# Patient Record
Sex: FEMALE | Race: WHITE | NOT HISPANIC OR LATINO | Employment: FULL TIME | URBAN - METROPOLITAN AREA
[De-identification: names, ages, dates, MRNs, and addresses within clinical notes are randomized per-mention and may not be internally consistent; named-entity substitution may affect disease eponyms.]

---

## 2018-04-18 ENCOUNTER — TRANSCRIBE ORDERS (OUTPATIENT)
Dept: ADMINISTRATIVE | Facility: HOSPITAL | Age: 58
End: 2018-04-18

## 2018-04-18 DIAGNOSIS — G47.30 SLEEP APNEA, UNSPECIFIED TYPE: Primary | ICD-10-CM

## 2018-08-22 ENCOUNTER — OFFICE VISIT (OUTPATIENT)
Dept: SLEEP CENTER | Facility: CLINIC | Age: 58
End: 2018-08-22
Payer: COMMERCIAL

## 2018-08-22 VITALS
WEIGHT: 193.6 LBS | DIASTOLIC BLOOD PRESSURE: 82 MMHG | BODY MASS INDEX: 30.39 KG/M2 | HEIGHT: 67 IN | SYSTOLIC BLOOD PRESSURE: 132 MMHG | HEART RATE: 80 BPM

## 2018-08-22 DIAGNOSIS — G47.30 SLEEP APNEA, UNSPECIFIED TYPE: ICD-10-CM

## 2018-08-22 PROCEDURE — 99204 OFFICE O/P NEW MOD 45 MIN: CPT | Performed by: NURSE PRACTITIONER

## 2018-08-22 RX ORDER — LEVOTHYROXINE SODIUM 0.1 MG/1
100 TABLET ORAL
COMMUNITY
Start: 2016-06-15

## 2018-08-22 RX ORDER — CLONAZEPAM 0.25 MG/1
0.25 TABLET, ORALLY DISINTEGRATING ORAL 2 TIMES DAILY
COMMUNITY
Start: 2018-08-21 | End: 2018-09-20

## 2018-08-22 RX ORDER — ZOLPIDEM TARTRATE 10 MG/1
TABLET ORAL
Qty: 1 TABLET | Refills: 0 | Status: SHIPPED | OUTPATIENT
Start: 2018-08-22

## 2018-08-22 RX ORDER — IBUPROFEN 200 MG
400 TABLET ORAL EVERY 6 HOURS
COMMUNITY

## 2018-08-22 RX ORDER — SIMVASTATIN 20 MG
20 TABLET ORAL
COMMUNITY
Start: 2018-08-21

## 2018-08-22 NOTE — PATIENT INSTRUCTIONS
1   Schedule split night study  2  Schedule follow-up appointment to review results of the study  3  Schedule DME appointment with [facility] for CPAP set up  4    Schedule compliance visit and evaluation of CPAP  8-12 weeks after starting CPAP

## 2018-08-22 NOTE — PROGRESS NOTES
Consultation - [address] [facility], 1960, MRN: [id number]    8/22/2018        Reason for Consult / Principal Problem:    Evaluation of sleep difficulties     Subjective:     HPI: Rk Sanchez is a 61y o  year old female  She presents today for evaluation of sleeping difficulties  She completed a split night sleep study at [address] [place] [address] in 2015 and was found to have mild sleep apnea  She was unable to tolerate CPAP during that study  She received a TAP oral appliance in 2015 and wore it for approximately one year  She went for an adjustment and found that the provider had retired  For the past 6 months she has experienced increasing symptoms of nighttime awakenings  She snores loudly and her daughter told her that she gasps for air when she is sleeping  She does not awaken feeling refreshed on most mornings  Recently, she has also been having difficulty falling asleep  Over the past week it has taken several hours for her to fall asleep  In the past, she has had periods of anxiety with panic attacks  The symptoms were improved with lexapro and therapy  She has had an increase in anxiety lately  She has hypothyroidism and normally takes 100 mcg of levothyroxine  She has had an intentional weight loss of 15 lb over the past 4 months  Her primary care physician has ordered thyroid blood work, which was completed yesterday, however, results are not available at this time  Review of Systems      Genitourinary need to urinate more than twice a night, hot flashes at night and post menopausal (no peroids)   Cardiology none   Gastrointestinal none   Neurology none   Constitutional none   Integumentary none   Psychiatry anxiety   Musculoskeletal none   Pulmonary snoring   ENT none   Endocrine frequent urination   Hematological none     Employment:  She currently works full-time as a  between the hours of 8:00 a m  and 5:00 p m   Monday through Friday    Sleep Schedule:       Bedtime:  10:00 p m  on week days and 11:00 p m  on weekends      Latency:  Sleep latency can be up to 2-3 hours and has been worse lately      Wakeup time:  6:30 a m  on weekdays and 8:00 a m  on weekends    Awakenings:       Frequency:  4-5 times per night      Causes:  Unknown causes, jolts awake      Duration: This varies between minutes up to several hours    Daytime Sleepiness / Inappropriate Sleep:       Most severe: In the afternoon between 2:00 p m  and 3:00 p m  Naps :  Once per week      Time:  Between 1:00 p m  and 2:00 p m  Duration:  30-45 minutes, naps are refreshing       Inappropriate drowsiness / sleep:  She is not find herself falling asleep with sedentary activities  Occasionally will doze in the car as a passenger  Snoring:  Family reports snoring    Apnea:  Family has reported gasping during sleep    Change in Weight:  15 lb intentional weight loss over the past 4 months    Restless Leg Syndrome:  She does occasionally experience clinical symptoms consistent with this diagnosis including year possible urge to move her legs when she lays down to go to sleep and some jerking movements, which prevent her from falling asleep  Symptoms resolve spontaneously    Other Complaints:  She denies any sleep walking, sleep talking, sleep paralysis or hallucinations surrounding sleep  Social History:      Caffeine:  8-10 oz of caffeinated coffee daily      Tobacco:   reports that she has never smoked  She has never used smokeless tobacco        Alcohol:   reports that she drinks alcohol  3-4 glasses of wine per week     Drugs:   reports that she does not use drugs         The review of systems and following portions of the patient's history were reviewed and updated as appropriate: allergies, current medications, past family history, past medical history, past social history, past surgical history and problem list       Objective:       Vitals:    08/22/18 0800   BP: 132/82   Pulse: 80   Weight: 87 8 kg (193 lb 9 6 oz)   Height: 5' 7" (1 702 m)     Body mass index is 30 32 kg/m²  Neck Circumference: 34 5 (cm)  [place] Sleepiness Scale: Total score: 10      Current Outpatient Prescriptions:     Calcium-Magnesium-Vitamin D (CALCIUM MAGNESIUM PO), Take by mouth, Disp: , Rfl:     clonazePAM (KlonoPIN) 0 25 MG disintegrating tablet, Take 0 25 mg by mouth 2 (two) times a day, Disp: , Rfl:     Coenzyme Q10 (COQ10 PO), Take by mouth, Disp: , Rfl:     ibuprofen (MOTRIN) 200 mg tablet, Take 400 mg by mouth every 6 (six) hours, Disp: , Rfl:     levothyroxine 100 mcg tablet, Take 100 mcg by mouth, Disp: , Rfl:     Multiple Vitamins-Minerals (MULTIVITAMIN PO), Take 1 each by mouth, Disp: , Rfl:     Probiotic Product (PRO-BIOTIC BLEND PO), Take by mouth, Disp: , Rfl:     simvastatin (ZOCOR) 20 mg tablet, 20 mg, Disp: , Rfl:     zolpidem (AMBIEN) 10 mg tablet, Take 1/2 tablet at lights out and may repeat in 30 minutes, if not sleeping, Disp: 1 tablet, Rfl: 0    Physical Exam  General Appearance:   Alert, cooperative, no distress, appears stated age, mildly obese     Head:   Normocephalic, without obvious abnormality, atraumatic     Eyes:   PERRL, conjunctiva/corneas clear, EOM's intact          Nose:  Nares normal, septum midline, mucosa normal, no drainage or sinus tenderness           Throat:  Lips, teeth and gums normal; tongue normal size and  shape and midline in position; mucosa moist and redundant bilaterally, uvula thick at the base and dipping below the base of the tongue, tonsils not visualized, Mallampati class 3       Neck:  Supple, symmetrical, trachea midline, no adenopathy;  Thyroid: No enlargement, tenderness or nodules; no carotid bruit or JVD     Lungs:      Clear to auscultation bilaterally, respirations unlabored     Heart:   Regular rate and rhythm, S1 and S2 normal, no murmur, rub or gallop       Extremities:  Extremities normal, atraumatic, no cyanosis or edema     Pulses:  2+ and symmetric all extremities     Skin:  Skin color, texture, turgor normal, no rashes or lesions       Neurologic:  CNII-XII intact  Normal strength, sensation throughout     Sleep Study Results:  Split night sleep study completed in April 2015 indicates mild sleep apnea with an AHI of 9  CPAP pressure was titrated up to 10-12 with alleviation of hypopneas, however, she was unable to tolerate CPAP  ASSESSMENT / PLAN     1  Sleep apnea, unspecified type  Sleep Consult - N/P Only    Split Study    zolpidem (AMBIEN) 10 mg tablet         Counseling / Coordination of Care  Total clinic time spent today 50 minutes  Greater than 50% of total time was spent with the patient and / or family counseling and / or coordination of care  A description of the counseling / coordination of care:     diagnostic results, instructions for management, risk factor reductions, prognosis, patient and family education, impressions, risks and benefits of treatment options and importance of compliance with treatment    Today we discussed the anatomy and physiology of the upper airway  I pointed out how changes in this region can result in both snoring and abnormal breathing events including apneas and hypopneas  I explained the most common co-morbidities of untreated sleep apnea  After this we talked about some forms of treatment including application of positive airway pressure, mandibular advancement devices and surgery  In order to evaluate the severity of Obstructive Sleep Apnea as a cause of the patient's worsening symptoms, a split study test will be completed  If significant abnormal nocturnal breathing is detected, nasal CPAP will be titrated to find the optimum pressure needed to maintain upper airway patency during sleep  Following testing, the patient will return to the Sleep [address] [place] [address] for a review of the test results and to set up equipment      The following instructions have been given to the patient today:    Patient Instructions   1  Schedule split night study  2  Schedule follow-up appointment to review results of the study  3  Schedule DME appointment with [facility] for CPAP set up  4    Schedule compliance visit and evaluation of CPAP  8-12 weeks after starting CPAP          Rosie Lorenzo, [address] [facility]

## 2018-10-08 ENCOUNTER — TELEPHONE (OUTPATIENT)
Dept: SLEEP CENTER | Facility: CLINIC | Age: 58
End: 2018-10-08

## 2018-10-18 ENCOUNTER — TRANSCRIBE ORDERS (OUTPATIENT)
Dept: SLEEP CENTER | Facility: CLINIC | Age: 58
End: 2018-10-18

## 2018-10-18 DIAGNOSIS — G47.33 OBSTRUCTIVE SLEEP APNEA SYNDROME: Primary | ICD-10-CM

## 2018-10-18 NOTE — TELEPHONE ENCOUNTER
Discussed with patient  Home sleep study has been ordered  Please call her to schedule 
Pt is scheduled for HST 
Spoke with Tracy Ndiaye from [place]  Pt was denied for Split Study ([id number])  Intake ID#: [id number] 
No abnormalities

## 2018-10-18 NOTE — PROGRESS NOTES
Spoke with patient regarding insurance denial of split night study  She had a diagnostic sleep study in 2014 with a CPAP titration study at [facility]  She states that she had a very bad night of sleep during CPAP titration and had difficulty with using different masks, etc   She did not continue CPAP  She has used a mouth guard since that time, but continues to snore  She has had some recent weight loss, which may have improved her sleep apnea, however, her  has noticed recent periods of apnea  A home sleep study will be ordered to confirm current diagnosis of sleep apnea  A CPAP titration study will be planned to allow her a full night to work with the technician for titration, since she had some difficulty at her prior study

## 2018-12-12 ENCOUNTER — TELEPHONE (OUTPATIENT)
Dept: SLEEP CENTER | Facility: CLINIC | Age: 58
End: 2018-12-12

## 2018-12-12 DIAGNOSIS — G47.33 OBSTRUCTIVE SLEEP APNEA SYNDROME: Primary | ICD-10-CM

## 2018-12-12 NOTE — TELEPHONE ENCOUNTER
Pt's diagnostic in lab study denied  The apporved HST  Do you want HST or do peer to peer  If HST can you enter the order  I will call the patient to schedule      Era

## 2021-04-08 DIAGNOSIS — Z23 ENCOUNTER FOR IMMUNIZATION: ICD-10-CM

## 2023-06-06 PROCEDURE — 88305 TISSUE EXAM BY PATHOLOGIST: CPT | Performed by: PATHOLOGY

## 2023-06-07 ENCOUNTER — LAB REQUISITION (OUTPATIENT)
Dept: LAB | Facility: HOSPITAL | Age: 63
End: 2023-06-07
Payer: COMMERCIAL

## 2023-06-07 DIAGNOSIS — Z83.71 FAMILY HISTORY OF COLONIC POLYPS: ICD-10-CM

## 2023-06-07 DIAGNOSIS — Z12.11 ENCOUNTER FOR SCREENING FOR MALIGNANT NEOPLASM OF COLON: ICD-10-CM

## 2023-06-12 PROCEDURE — 88305 TISSUE EXAM BY PATHOLOGIST: CPT | Performed by: PATHOLOGY

## 2023-10-27 ENCOUNTER — HOSPITAL ENCOUNTER (OUTPATIENT)
Dept: RADIOLOGY | Facility: IMAGING CENTER | Age: 63
Discharge: HOME | End: 2023-10-27
Payer: COMMERCIAL

## 2023-10-27 DIAGNOSIS — S93.401A SPRAIN OF RIGHT ANKLE, UNSPECIFIED LIGAMENT, INITIAL ENCOUNTER: ICD-10-CM

## 2023-10-27 PROCEDURE — 73610 X-RAY EXAM OF ANKLE: CPT

## 2024-05-01 ENCOUNTER — HOSPITAL ENCOUNTER (OUTPATIENT)
Dept: RADIOLOGY | Facility: IMAGING CENTER | Age: 64
Discharge: HOME | End: 2024-05-01
Payer: COMMERCIAL

## 2024-05-01 DIAGNOSIS — M70.41 PREPATELLAR BURSITIS OF RIGHT KNEE: ICD-10-CM

## 2024-05-01 PROCEDURE — 73560 X-RAY EXAM OF KNEE 1 OR 2: CPT
